# Patient Record
Sex: MALE | Race: WHITE
[De-identification: names, ages, dates, MRNs, and addresses within clinical notes are randomized per-mention and may not be internally consistent; named-entity substitution may affect disease eponyms.]

---

## 2018-05-31 ENCOUNTER — HOSPITAL ENCOUNTER (EMERGENCY)
Dept: HOSPITAL 33 - ED | Age: 53
Discharge: HOME | End: 2018-05-31
Payer: MEDICARE

## 2018-05-31 VITALS — OXYGEN SATURATION: 98 % | DIASTOLIC BLOOD PRESSURE: 82 MMHG | SYSTOLIC BLOOD PRESSURE: 147 MMHG | HEART RATE: 80 BPM

## 2018-05-31 DIAGNOSIS — M54.5: Primary | ICD-10-CM

## 2018-05-31 DIAGNOSIS — F45.42: ICD-10-CM

## 2018-05-31 DIAGNOSIS — M62.838: ICD-10-CM

## 2018-05-31 DIAGNOSIS — R20.0: ICD-10-CM

## 2018-05-31 DIAGNOSIS — Z86.73: ICD-10-CM

## 2018-05-31 DIAGNOSIS — G89.29: ICD-10-CM

## 2018-05-31 DIAGNOSIS — M77.12: ICD-10-CM

## 2018-05-31 DIAGNOSIS — Z72.0: ICD-10-CM

## 2018-05-31 PROCEDURE — 99283 EMERGENCY DEPT VISIT LOW MDM: CPT

## 2018-05-31 PROCEDURE — 73080 X-RAY EXAM OF ELBOW: CPT

## 2018-05-31 PROCEDURE — 96372 THER/PROPH/DIAG INJ SC/IM: CPT

## 2018-05-31 PROCEDURE — 72100 X-RAY EXAM L-S SPINE 2/3 VWS: CPT

## 2018-05-31 NOTE — XRAY
Exam: 3 view left elbow series from 5/31/2018.



Comparison: None.



Indication: Lateral left elbow pain  3 months, probably pain started after

mowing and digging.



Findings: AP, oblique, and lateral radiographs placement for evaluation.



I see no acute fracture, dislocation, or joint effusion.  The left elbow joint

space appears unremarkable without periarticular calcifications.  There

appears to be a small 7.2 mm in length supracondylar process extending from

the anterior margin of the left humeral shaft on the lateral image, located

about 6 cm proximal to the left elbow joint.  This represents a  vestigial

structure and is rarely associated with symptoms.  It occurs in about 1% of

people of  origin.  I believe this is an incidental finding.



Impression:



1.  No acute left elbow fracture, dislocation, or joint effusion is seen.



2.  The left elbow joint space appears well preserved with smooth articular

margins.  No abnormal periarticular soft tissue calcifications are seen.



3.  Incidental note of small supracondylar process arising off the anterior

margin of the distal left humeral shaft about 6 cm proximal to the left elbow

joint.  I believe this is an incidental finding.

## 2018-05-31 NOTE — XRAY
Exam: Limited 3 view study of the lumbar spine from 5/31/2018.



Comparison: None.



Indication: Low back pain 1 month, not getting any better.



Findings: AP, lateral, and a coned-down lateral film of the lumbosacral

junction were obtained.



There are 5 non-rib-bearing lumbar-type vertebra.  I see no acute lumbar spine

fracture, AP subluxation, or definite spondylolysis.  The lumbar interspace

heights are well-maintained.  Small anterior vertebral endplate spurs are

noted at L3-L4 and L4-L5.  There is slight compromise of the L4-L5 and L5-S1

neural foramen as compared to the upper lumbar neural foramen on the lateral

images.  No other focal bone lesion is seen.



Impression:



1.  No acute lumbar spine fracture, AP subluxation, or bone destruction is

seen.

## 2018-05-31 NOTE — ERPHSYRPT
- History of Present Illness


Time Seen by Provider: 05/31/18 10:02


Source: patient


Exam Limitations: clinical condition


Patient Subjective Stated Complaint: Lower back pain x1.5 months, left arm pain 

x2 months.


Triage Nursing Assessment: Pt presents to the ED with complaints of lower back 

pain worse with movement. Pt states unknown injury but pain began 1.5 months 

ago. Pt states pain is worse with movement. No distress noted, skin PWD.


Physician History: 





PATIENT  WITH A HISTORY OF TIA, COPD AND AIDS, STATES HE HAS HAD LOW BACK PAIN 

FOR THE PAST 6 WEEKS ASSOCIATED WITH LEFT ELBOW PAIN FOR 2 MONTHS,  AFTER USING 

GARDEN E[employer]IPMENT. DENIES HISTORY OF TRAUMA OR INJURY, RADIATION OF PAIN INTO 

LEGS, NUMBNESS, TINGLING OR WEAKNESS.


Timing/Duration: week(s)


Method of Injury: unknown


Quality: sharp


Back Pain Location: lumbar spine


Back Pain Radiation: buttocks


Severity of Pain-Max: moderate


Modifying Factors: Improves With: movement


Associated Symptoms: muscle spasms


Previous symptoms: same symptoms as today


Allergies/Adverse Reactions: 








Penicillins Allergy (Mild, Verified 05/31/18 09:53)


 Rash





Hx Tetanus, Diphtheria Vaccination/Date Given: Yes


Hx Influenza Vaccination/Date Given: Yes


Hx Pneumococcal Vaccination/Date Given: No


Immunizations Up to Date: Yes





- Review of Systems


Constitutional: No Fever, No Chills


Eyes: No Symptoms


Ears, Nose, & Throat: No Symptoms


Respiratory: No Symptoms, No Cough, No Dyspnea


Cardiac: No Symptoms, No Chest Pain, No Edema, No Syncope


Abdominal/Gastrointestinal: No Symptoms, No Abdominal Pain, No Nausea, No 

Vomiting, No Diarrhea


Genitourinary Symptoms: No Symptoms, No Dysuria


Musculoskeletal: Joint Pain, No Back Pain, No Neck Pain


Skin: No Rash


Neurological: No Dizziness, No Focal Weakness, No Sensory Changes


Psychological: No Symptoms


Endocrine: No Symptoms


All Other Systems: Reviewed and Negative





- Past Medical History


Pertinent Past Medical History: Yes


Neurological History: TIA


Respiratory History: Emphysema





- Past Surgical History


Past Surgical History: No





- Social History


Smoking Status: Current every day smoker


How long have you smoked: 43 years


Exposure to second hand smoke: Yes


Drug Use: none


Patient Lives Alone: No





- Nursing Vital Signs


Nursing Vital Signs: 


 Initial Vital Signs











Temperature  97.9 F   05/31/18 09:48


 


Pulse Rate  67   05/31/18 09:48


 


Respiratory Rate  16   05/31/18 09:48


 


Blood Pressure  135/84   05/31/18 09:48


 


O2 Sat by Pulse Oximetry  99   05/31/18 09:48








 Pain Scale











Pain Intensity [Lower Back]    6


 


Pain Intensity                 6

















- Physical Exam


General Appearance: no apparent distress, alert


Eye Exam: PERRL/EOMI, eyes nml inspection


Neck Exam: normal inspection, non-tender, supple, full range of motion, No 

meningismus, No midline tenderness


Respiratory Exam: normal breath sounds, lungs clear, No respiratory distress


Cardiovascular Exam: regular rate/rhythm, normal heart sounds


Gastrointestinal Exam: soft, No tenderness, No mass


Extremity Exam: normal inspection, normal range of motion, joint swelling, No 

calf tenderness, No pedal edema


Peripheral Pulses: carotid (R): 2+, carotid (L): 2+, femoral (R): 2+, femoral (L

): 2+, dorsalis-pedis (R): 2+, dorsalis-pedis (L): 2+


Neurologic Exam: alert, oriented x 3, cooperative, CNs II-XII nml as tested, 

normal mood/affect, nml station & gait, sensation nml, No motor deficits


Skin Exam: normal color, warm, dry, No rash


SpO2: 99


Oxygen Delivery: Room Air





- Radiology Exams


  ** Left Elbow


X-ray Interpretation: Interpreted by me, Negative, No Fracture, No Subluxation (

NO DISLOCATION)





  ** L-Spine


X-ray Interpretation: Interpreted by me, Negative, No Fracture, No Subluxation


Ordered Tests: 


 Active Orders 24 hr











 Category Date Time Status


 


 ELBOW (MINIMUM 3 VIEWS) Stat Exams  05/31/18 10:11 Taken


 


 LUMBAR LIMITED (2 OR 3 VIEWS) Stat Exams  05/31/18 10:09 Taken








Medication Summary














Discontinued Medications














Generic Name Dose Route Start Last Admin





  Trade Name John  PRN Reason Stop Dose Admin


 


Orphenadrine Citrate  60 mg  05/31/18 10:09  05/31/18 10:29





  Norflex 60 Mg/2 Ml***  IM  05/31/18 10:10  60 mg





  STAT ONE   Administration


 


Orphenadrine Citrate  Confirm  05/31/18 10:26  





  Norflex 60 Mg/2 Ml***  Administered  05/31/18 10:27  





  Dose   





  60 mg   





  .ROUTE   





  .STK-MED ONE   














- Progress


Progress Note: 





05/31/18 10:19


PATIENT REFUSED ANALGESICS, NORFLEX 60MG IM


Counseled pt/family regarding: diagnosis, rad results





- Departure


Time of Disposition: 11:15


Departure Disposition: Home


Clinical Impression: 


 CHRONIC LOW BACK PAIN, LEFT LATERAL EPICONDYLITIS





Condition: Stable


Critical Care Time: No


Referrals: 


DOCTOR,NO FAMILY [Primary Care Provider] - 


Additional Instructions: 


BEGIN NORFLEX 100MG TWICE DAILY FOR LOWER BACK MUSCLE SPASMS. TYLENOL OR MOTRIN 

AS NEEDED FOR PAIN DISCOMFORT. CONSULT YOUR PRIMARY CARE PROVIDER FOR EVALUATION

, TREATMENT AND PHYSICAL THERAPY


Prescriptions: 


Orphenadrine Citrate 100 mg*** [Norflex 100 MG Tablet***] 100 mg PO BID #14 tab